# Patient Record
Sex: FEMALE | Race: OTHER | NOT HISPANIC OR LATINO | URBAN - METROPOLITAN AREA
[De-identification: names, ages, dates, MRNs, and addresses within clinical notes are randomized per-mention and may not be internally consistent; named-entity substitution may affect disease eponyms.]

---

## 2024-05-28 ENCOUNTER — NEW REFERRAL (OUTPATIENT)
Dept: URBAN - METROPOLITAN AREA CLINIC 8 | Facility: CLINIC | Age: 89
End: 2024-05-28

## 2024-05-28 DIAGNOSIS — E11.9: ICD-10-CM

## 2024-05-28 DIAGNOSIS — H35.3133: ICD-10-CM

## 2024-05-28 DIAGNOSIS — H43.813: ICD-10-CM

## 2024-05-28 PROCEDURE — 99203 OFFICE O/P NEW LOW 30 MIN: CPT

## 2024-05-28 PROCEDURE — 92202 OPSCPY EXTND ON/MAC DRAW: CPT

## 2024-05-28 PROCEDURE — 92134 CPTRZ OPH DX IMG PST SGM RTA: CPT

## 2024-05-28 ASSESSMENT — VISUAL ACUITY
OS_CC: 20/200
OD_CC: 20/400
OD_CC: 20/60-1
OS_CC: 20/50-2

## 2024-05-28 ASSESSMENT — TONOMETRY
OS_IOP_MMHG: 15
OD_IOP_MMHG: 18

## 2024-08-06 PROBLEM — Z00.00 ENCOUNTER FOR PREVENTIVE HEALTH EXAMINATION: Status: ACTIVE | Noted: 2024-08-06

## 2024-08-13 ENCOUNTER — NON-APPOINTMENT (OUTPATIENT)
Age: 89
End: 2024-08-13

## 2024-08-14 ENCOUNTER — APPOINTMENT (OUTPATIENT)
Dept: NEUROSURGERY | Facility: CLINIC | Age: 89
End: 2024-08-14
Payer: MEDICARE

## 2024-08-14 VITALS
WEIGHT: 194 LBS | HEIGHT: 66 IN | SYSTOLIC BLOOD PRESSURE: 132 MMHG | DIASTOLIC BLOOD PRESSURE: 78 MMHG | BODY MASS INDEX: 31.18 KG/M2

## 2024-08-14 DIAGNOSIS — Z09 ENCOUNTER FOR FOLLOW-UP EXAMINATION AFTER COMPLETED TREATMENT FOR CONDITIONS OTHER THAN MALIGNANT NEOPLASM: ICD-10-CM

## 2024-08-14 DIAGNOSIS — Z86.39 PERSONAL HISTORY OF OTHER ENDOCRINE, NUTRITIONAL AND METABOLIC DISEASE: ICD-10-CM

## 2024-08-14 DIAGNOSIS — Z63.4 DISAPPEARANCE AND DEATH OF FAMILY MEMBER: ICD-10-CM

## 2024-08-14 DIAGNOSIS — Z86.69 PERSONAL HISTORY OF OTHER DISEASES OF THE NERVOUS SYSTEM AND SENSE ORGANS: ICD-10-CM

## 2024-08-14 DIAGNOSIS — Z86.79 PERSONAL HISTORY OF OTHER DISEASES OF THE CIRCULATORY SYSTEM: ICD-10-CM

## 2024-08-14 DIAGNOSIS — Z78.9 OTHER SPECIFIED HEALTH STATUS: ICD-10-CM

## 2024-08-14 DIAGNOSIS — Z83.3 FAMILY HISTORY OF DIABETES MELLITUS: ICD-10-CM

## 2024-08-14 DIAGNOSIS — G43.909 MIGRAINE, UNSPECIFIED, NOT INTRACTABLE, W/OUT STATUS MIGRAINOSUS: ICD-10-CM

## 2024-08-14 DIAGNOSIS — Z87.891 PERSONAL HISTORY OF NICOTINE DEPENDENCE: ICD-10-CM

## 2024-08-14 RX ORDER — ROSUVASTATIN CALCIUM 20 MG/1
20 TABLET, FILM COATED ORAL
Refills: 0 | Status: ACTIVE | COMMUNITY

## 2024-08-14 RX ORDER — CHOLECALCIFEROL (VITAMIN D3) 125 MCG
TABLET ORAL
Refills: 0 | Status: ACTIVE | COMMUNITY

## 2024-08-14 RX ORDER — METFORMIN HYDROCHLORIDE 500 MG/1
500 TABLET, COATED ORAL
Refills: 0 | Status: ACTIVE | COMMUNITY

## 2024-08-14 RX ORDER — APIXABAN 5 MG/1
5 TABLET, FILM COATED ORAL
Refills: 0 | Status: ACTIVE | COMMUNITY

## 2024-08-14 RX ORDER — ESOMEPRAZOLE MAGNESIUM 40 MG/1
40 CAPSULE, DELAYED RELEASE ORAL
Refills: 0 | Status: ACTIVE | COMMUNITY

## 2024-08-14 RX ORDER — LOSARTAN POTASSIUM 25 MG/1
25 TABLET, FILM COATED ORAL
Refills: 0 | Status: ACTIVE | COMMUNITY

## 2024-08-14 RX ORDER — GLIMEPIRIDE 2 MG/1
2 TABLET ORAL
Refills: 0 | Status: ACTIVE | COMMUNITY

## 2024-08-14 SDOH — SOCIAL STABILITY - SOCIAL INSECURITY: DISSAPEARANCE AND DEATH OF FAMILY MEMBER: Z63.4

## 2024-08-14 NOTE — REVIEW OF SYSTEMS
[As Noted in HPI] : as noted in HPI [Negative] : Heme/Lymph [Difficulty Walking] : no difficulty walking [de-identified] : steady with a straight cane

## 2024-08-14 NOTE — END OF VISIT
[FreeTextEntry3] :  I, Dr. Daniel, personally performed the evaluation and management (E/M) services for this new patient.  That E/M includes conducting the initial examination, assessing all conditions, and establishing the plan of care.  Today, my TREY, was here to observe my evaluation and management services for this patient to be followed going forward.

## 2024-08-14 NOTE — ASSESSMENT
[FreeTextEntry1] : 89-year-old female presenting after an ER visit for complaints of headache and nausea with a CTA that incidentally identified right MCA stenosis and what was thought to be believed as a left PCOM aneurysm. Upon further review of the imaging, no aneurysm was noted. Artifact was present across carotid arteries, no indication of a dissection. There is no warrant for a neurosurgical or endovascular intervention. She will follow-up as needed going forward.  No concerns were identified during the visit and we thank her for allowing us to be a part of her care team.   Izzy Leon MS, FNP-BC Mina Daniel MD, [facility]

## 2024-08-14 NOTE — HISTORY OF PRESENT ILLNESS
[de-identified] : 89-year-old female presents the neurosurgery office today for an initial neurosurgical consultation, alongside her daughter, to discuss the results of an incidental finding on a CTA.  Patient is overall doing well.  /78.  She denies any dizziness, visual disturbances or one-sided weakness.  She experiences intermittent headaches, but they are usually resolved with home remedies.  She presented to [facility] on 7/21/2024 for headache, dizziness, and nausea.  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 She was reassured that upon further review of the imaging today, it does not appear to be a "true" aneurysm and rather artifact. There is no neurosurgical or endovascular intervention warranted at this time.  She will proceed to follow-up on an as-needed basis going forward.  All questions were answered today and the patient verbalized understanding of the importance of proper blood pressure management during the visit.

## 2024-08-14 NOTE — PHYSICAL EXAM
[General Appearance - Alert] : alert [General Appearance - In No Acute Distress] : in no acute distress [Oriented To Time, Place, And Person] : oriented to person, place, and time [Abnormal Walk] : normal gait [Balance] : balance was intact

## 2024-08-14 NOTE — REVIEW OF SYSTEMS
[As Noted in HPI] : as noted in HPI [Negative] : Heme/Lymph [Difficulty Walking] : no difficulty walking [de-identified] : steady with a straight cane

## 2024-08-14 NOTE — ASSESSMENT
[FreeTextEntry1] : 89-year-old female presenting after an ER visit for complaints of headache and nausea with a CTA that incidentally identified right MCA stenosis and what was thought to be believed as a left PCOM aneurysm. Upon further review of the imaging, no aneurysm was noted. Artifact was present across carotid arteries, no indication of a dissection. There is no warrant for a neurosurgical or endovascular intervention. She will follow-up as needed going forward.  No concerns were identified during the visit and we thank her for allowing us to be a part of her care team.   Izzy Leon MS, FNP-BC Mina Daniel MD, [facility]

## 2024-08-14 NOTE — HISTORY OF PRESENT ILLNESS
[de-identified] : 89-year-old female presents the neurosurgery office today for an initial neurosurgical consultation, alongside her daughter, to discuss the results of an incidental finding on a CTA.  Patient is overall doing well.  /78.  She denies any dizziness, visual disturbances or one-sided weakness.  She experiences intermittent headaches, but they are usually resolved with home remedies.  She presented to [facility] on 7/21/2024 for headache, dizziness, and nausea.  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 She was reassured that upon further review of the imaging today, it does not appear to be a "true" aneurysm and rather artifact. There is no neurosurgical or endovascular intervention warranted at this time.  She will proceed to follow-up on an as-needed basis going forward.  All questions were answered today and the patient verbalized understanding of the importance of proper blood pressure management during the visit.